# Patient Record
Sex: MALE | Race: WHITE | NOT HISPANIC OR LATINO | Employment: FULL TIME | URBAN - METROPOLITAN AREA
[De-identification: names, ages, dates, MRNs, and addresses within clinical notes are randomized per-mention and may not be internally consistent; named-entity substitution may affect disease eponyms.]

---

## 2017-04-13 ENCOUNTER — APPOINTMENT (OUTPATIENT)
Dept: GENERAL RADIOLOGY | Facility: HOSPITAL | Age: 47
End: 2017-04-13

## 2017-04-13 ENCOUNTER — HOSPITAL ENCOUNTER (EMERGENCY)
Facility: HOSPITAL | Age: 47
Discharge: HOME | End: 2017-04-13
Attending: EMERGENCY MEDICINE | Admitting: EMERGENCY MEDICINE

## 2017-04-13 ENCOUNTER — APPOINTMENT (OUTPATIENT)
Dept: CT IMAGING | Facility: HOSPITAL | Age: 47
End: 2017-04-13

## 2017-04-13 VITALS
TEMPERATURE: 98.8 F | SYSTOLIC BLOOD PRESSURE: 129 MMHG | HEART RATE: 58 BPM | OXYGEN SATURATION: 98 % | WEIGHT: 185 LBS | HEIGHT: 71 IN | RESPIRATION RATE: 16 BRPM | BODY MASS INDEX: 25.9 KG/M2 | DIASTOLIC BLOOD PRESSURE: 87 MMHG

## 2017-04-13 DIAGNOSIS — S00.83XA CONTUSION OF FACE, INITIAL ENCOUNTER: ICD-10-CM
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
## 2017-04-13 DIAGNOSIS — S10.81XA: ICD-10-CM

## 2017-04-13 DIAGNOSIS — L08.9: ICD-10-CM

## 2017-04-13 DIAGNOSIS — S20.20XA CONTUSION OF THORAX, INITIAL ENCOUNTER: ICD-10-CM

## 2017-04-13 PROCEDURE — 70450 CT HEAD/BRAIN W/O DYE: CPT

## 2017-04-13 PROCEDURE — 72072 X-RAY EXAM THORAC SPINE 3VWS: CPT

## 2017-04-13 PROCEDURE — 96372 THER/PROPH/DIAG INJ SC/IM: CPT

## 2017-04-13 PROCEDURE — 25010000002 KETOROLAC TROMETHAMINE PER 15 MG: Performed by: EMERGENCY MEDICINE

## 2017-04-13 PROCEDURE — 71010 HC CHEST PA OR AP: CPT

## 2017-04-13 PROCEDURE — 99284 EMERGENCY DEPT VISIT MOD MDM: CPT

## 2017-04-13 PROCEDURE — 72125 CT NECK SPINE W/O DYE: CPT

## 2017-04-13 PROCEDURE — 72170 X-RAY EXAM OF PELVIS: CPT

## 2017-04-13 RX ORDER — IBUPROFEN 800 MG/1
800 TABLET ORAL EVERY 8 HOURS PRN
Qty: 21 TABLET | Refills: 0 | Status: SHIPPED | OUTPATIENT
Start: 2017-04-13

## 2017-04-13 RX ORDER — CYCLOBENZAPRINE HCL 10 MG
10 TABLET ORAL ONCE
Status: COMPLETED | OUTPATIENT
Start: 2017-04-13 | End: 2017-04-13

## 2017-04-13 RX ORDER — OXYCODONE HYDROCHLORIDE AND ACETAMINOPHEN 5; 325 MG/1; MG/1
2 TABLET ORAL ONCE
Status: COMPLETED | OUTPATIENT
Start: 2017-04-13 | End: 2017-04-13

## 2017-04-13 RX ORDER — KETOROLAC TROMETHAMINE 30 MG/ML
60 INJECTION, SOLUTION INTRAMUSCULAR; INTRAVENOUS ONCE
Status: COMPLETED | OUTPATIENT
Start: 2017-04-13 | End: 2017-04-13

## 2017-04-13 RX ORDER — CYCLOBENZAPRINE HCL 10 MG
10 TABLET ORAL 3 TIMES DAILY PRN
Qty: 30 TABLET | Refills: 0 | Status: SHIPPED | OUTPATIENT
Start: 2017-04-13

## 2017-04-13 RX ORDER — HYDROCODONE BITARTRATE AND ACETAMINOPHEN 5; 325 MG/1; MG/1
1 TABLET ORAL EVERY 6 HOURS PRN
Qty: 10 TABLET | Refills: 0 | Status: SHIPPED | OUTPATIENT
Start: 2017-04-13

## 2017-04-13 RX ADMIN — KETOROLAC TROMETHAMINE 60 MG: 30 INJECTION, SOLUTION INTRAMUSCULAR at 16:58

## 2017-04-13 RX ADMIN — CYCLOBENZAPRINE HYDROCHLORIDE 10 MG: 10 TABLET, FILM COATED ORAL at 16:57

## 2017-04-13 RX ADMIN — OXYCODONE HYDROCHLORIDE AND ACETAMINOPHEN 2 TABLET: 5; 325 TABLET ORAL at 16:57

## 2017-04-13 NOTE — ED PROVIDER NOTES
" EMERGENCY DEPARTMENT ENCOUNTER    CHIEF COMPLAINT  Chief Complaint: fall  History given by: patient   History limited by: nothing   Room Number: 09/09  PMD: No Known Provider      HPI:  Pt is a 47 y.o. male who presents with R shoulder and neck pain secondary a fall that occurred 2 hours ago.  He reports that he was standing on a stepladder that was set against the back of his dump truck.  The dump truck bed was raised and he was scraping: Rash out of the bed.  He shifted his weight forward and the ladder fell causing him to land in the bed of the dump truck.  He states he landed on the left side of his head and that his body \"flipped over \".  He says everything went black but does not think that he lost consciousness as he remembers falling.  Once he landed in the bed of the dump truck, he then slid down the bed of the dump truck and fell again approximately 3 feet to the ground.  Pt states that his body was laying against his head after the fall.. Pt c/o of left axillary and neck pain that worsens when he breathes.     Duration:  today  Onset: gradual   Timing: constant  Location: head, neck , shoulder  Radiation: n/a  Quality: pain   Intensity/Severity: sever  Progression: unchanged  Associated Symptoms: neck and shoulder pain   Aggravating Factors: breathing  Alleviating Factors: none   Previous Episodes: none  Treatment before arrival: none     PAST MEDICAL HISTORY  Active Ambulatory Problems     Diagnosis Date Noted   • No Active Ambulatory Problems     Resolved Ambulatory Problems     Diagnosis Date Noted   • No Resolved Ambulatory Problems     Past Medical History:   Diagnosis Date   • Hypertension        PAST SURGICAL HISTORY  History reviewed. No pertinent surgical history.    FAMILY HISTORY  History reviewed. No pertinent family history.    SOCIAL HISTORY  Social History     Social History   • Marital status:      Spouse name: N/A   • Number of children: N/A   • Years of education: N/A "     Occupational History   • Not on file.     Social History Main Topics   • Smoking status: Not on file   • Smokeless tobacco: Not on file   • Alcohol use Not on file   • Drug use: Not on file   • Sexual activity: Not on file     Other Topics Concern   • Not on file     Social History Narrative   • No narrative on file       ALLERGIES  Morphine and related and Paxil [paroxetine hcl]    REVIEW OF SYSTEMS  Review of Systems   Constitutional: Negative for activity change, appetite change and fever.   HENT: Negative for congestion and sore throat.    Eyes: Negative.    Respiratory: Negative for cough and shortness of breath.    Cardiovascular: Negative for chest pain and leg swelling.   Gastrointestinal: Negative for abdominal pain, diarrhea and vomiting.   Endocrine: Negative.    Genitourinary: Negative for decreased urine volume and dysuria.   Musculoskeletal: Positive for back pain, neck pain and neck stiffness.   Skin: Negative for rash and wound.   Allergic/Immunologic: Negative.    Neurological: Negative for weakness, numbness and headaches.   Hematological: Negative.    Psychiatric/Behavioral: Negative.    All other systems reviewed and are negative.      PHYSICAL EXAM  ED Triage Vitals   Temp Heart Rate Resp BP SpO2   04/13/17 1554 04/13/17 1554 04/13/17 1554 04/13/17 1554 04/13/17 1554   98.8 °F (37.1 °C) 75 20 133/90 96 %      Temp src Heart Rate Source Patient Position BP Location FiO2 (%)   -- -- -- -- --              Physical Exam   Constitutional: He is oriented to person, place, and time and well-developed, well-nourished, and in no distress.   HENT:   Head: Normocephalic. Head is with abrasion (Left eyebrow and L side of head. ).   No malocculsion      Eyes: EOM are normal. Pupils are equal, round, and reactive to light.   Neck: Neck supple.   C-collar in place   Cardiovascular: Normal rate, regular rhythm and normal heart sounds.    Pulmonary/Chest: Effort normal and breath sounds normal. No  respiratory distress.   There is no paradoxical motion nor subcutaneous emphysema.  He is somewhat tender over his left superior lateral ribs.   Abdominal: Soft. There is no tenderness. There is no rebound and no guarding.   Musculoskeletal: Normal range of motion. He exhibits no edema.   No mid line or para spinal tenderness  Mild L and R paracervical to palpation  No lumbar tenderness      Neurovascular intact x4   Neurological: He is alert and oriented to person, place, and time. He has normal sensation and normal strength.   Skin: Skin is warm and dry.   Psychiatric: Mood and affect normal.   Nursing note and vitals reviewed.      LAB RESULTS  Lab Results (last 24 hours)     ** No results found for the last 24 hours. **          I ordered the above labs and reviewed the results    RADIOLOGY  XR Spine Thoracic 3 View   Preliminary Result   No active disease is seen in the chest.       THORACIC SPINE PLAIN FILMS: 3 views of the thoracic spine including AP   and lateral views of the entire thoracic spine and coned-down, swimmers,   and lateral view of the lower cervical and upper thoracic spine are   submitted for interpretation. On the lateral view the thoracic vertebral   body heights are well-maintained. There is good alignment of the   thoracic vertebrae. On the AP view the pedicles and spinous processes   are intact. The posterior medial ribs are intact. Medial lungs are   clear.       IMPRESSION: No acute fracture is seen in thoracic spine.          XR Chest 1 View   Preliminary Result   No active disease is seen in the chest.       THORACIC SPINE PLAIN FILMS: 3 views of the thoracic spine including AP   and lateral views of the entire thoracic spine and coned-down, swimmers,   and lateral view of the lower cervical and upper thoracic spine are   submitted for interpretation. On the lateral view the thoracic vertebral   body heights are well-maintained. There is good alignment of the   thoracic vertebrae. On  the AP view the pedicles and spinous processes   are intact. The posterior medial ribs are intact. Medial lungs are   clear.       IMPRESSION: No acute fracture is seen in thoracic spine.          XR Pelvis 1 or 2 View   Preliminary Result   No active disease is seen in the chest.       THORACIC SPINE PLAIN FILMS: 3 views of the thoracic spine including AP   and lateral views of the entire thoracic spine and coned-down, swimmers,   and lateral view of the lower cervical and upper thoracic spine are   submitted for interpretation. On the lateral view the thoracic vertebral   body heights are well-maintained. There is good alignment of the   thoracic vertebrae. On the AP view the pedicles and spinous processes   are intact. The posterior medial ribs are intact. Medial lungs are   clear.       IMPRESSION: No acute fracture is seen in thoracic spine.          CT Head Without Contrast   Preliminary Result       No evidence for acute traumatic intracranial pathology.       CT scan of the cervical spine was obtained with 2 mm axial images.   Sagittal reconstructed images were obtained.       FINDINGS:       There is no evidence for acute fracture or bony malalignment within the   cervical spine.       There is loss of the usual lordotic curvature of the cervical spine.       There is a disc osteophyte complex at C5-6 which results in a mild   degree of canal stenosis. Uncovertebral joint hypertrophy results in   mild to moderate bilateral foraminal narrowing at the C5-6 level.       IMPRESSION:        No evidence for acute fracture or bony malalignment involving the   cervical spine.       Degenerative phenomena primarily at the C5-6 levels, discussed in detail   above.                  CT Cervical Spine Without Contrast   Preliminary Result       No evidence for acute traumatic intracranial pathology.       CT scan of the cervical spine was obtained with 2 mm axial images.   Sagittal reconstructed images were obtained.        FINDINGS:       There is no evidence for acute fracture or bony malalignment within the   cervical spine.       There is loss of the usual lordotic curvature of the cervical spine.       There is a disc osteophyte complex at C5-6 which results in a mild   degree of canal stenosis. Uncovertebral joint hypertrophy results in   mild to moderate bilateral foraminal narrowing at the C5-6 level.       IMPRESSION:        No evidence for acute fracture or bony malalignment involving the   cervical spine.       Degenerative phenomena primarily at the C5-6 levels, discussed in detail   above.                       I ordered the above noted radiological studies. Interpreted by radiologist. Reviewed by me in PACS.       PROCEDURES  Procedures      PROGRESS AND CONSULTS  ED Course     4:28PM Ordered chest and spine XR, spine and head CT for further evaluation. Ordered Toradol and Percocet for pain  7:00PM Rechecked pt. Pt is resting comfortably but still c/o of stiffness. I informed him of his XR findings which show nothing acute with plans for discharge. Pt understands and agrees. All questions are addressed.         MEDICAL DECISION MAKING  Results were reviewed/discussed with the patient and they were also made aware of online access. Pt also made aware that some labs, such as cultures, will not be resulted during ER visit and follow up with PMD is necessary.     MDM  Number of Diagnoses or Management Options  Contusion of face, initial encounter:   Contusion of thorax, initial encounter:   Face abrasion, infected, initial encounter:   Injury resulting from fall from height:      Amount and/or Complexity of Data Reviewed  Tests in the radiology section of CPT®: reviewed and ordered           DIAGNOSIS  Final diagnoses:   Injury resulting from fall from height   Contusion of face, initial encounter   Face abrasion, infected, initial encounter   Contusion of thorax, initial encounter       DISPOSITION  DISCHARGE    Patient  discharged in stable condition.    Reviewed implications of results, diagnosis, meds, responsibility to follow up, warning signs and symptoms of possible worsening, potential complications and reasons to return to ER.    Patient/Family voiced understanding of above instructions.    Discussed plan for discharge, as there is no emergent indication for admission.  Pt/family is agreeable and understands need for follow up and repeat testing.  Pt is aware that discharge does not mean that nothing is wrong but it indicates no emergency is present that requires admission and they must continue care with follow-up as given below or physician of their choice.     FOLLOW-UP  [facility] ER REFERRAL SERVICE  [facility] [id number]  [phone]             Medication List      New Prescriptions          cyclobenzaprine 10 MG tablet   Commonly known as:  FLEXERIL   Take 1 tablet by mouth 3 (Three) Times a Day As Needed for Muscle Spasms.       HYDROcodone-acetaminophen 5-325 MG per tablet   Commonly known as:  NORCO   Take 1 tablet by mouth Every 6 (Six) Hours As Needed for Moderate Pain   (4-6).       ibuprofen 800 MG tablet   Commonly known as:  ADVIL,MOTRIN   Take 1 tablet by mouth Every 8 (Eight) Hours As Needed for Mild Pain   (1-3).               Latest Documented Vital Signs:  As of 8:30 PM  BP- 129/87 HR- 58 Temp- 98.8 °F (37.1 °C) O2 sat- 98%    --  Documentation assistance provided by reina Stiles for Dr. Jeronimo.  Information recorded by the scribe was done at my direction and has been verified and validated by me.     Jazmin Stiles  04/13/17 1629       Jazmin Stiles  04/13/17 1904       Jazmin Stiles  04/13/17 6010       Binh Jeronimo MD  04/13/17 2825

## 2017-04-13 NOTE — ED NOTES
Pt had a 20ft ladder raised against a dump truck that had its bed raised. Pt fell from the ladder and landed inside the dump truck bed, then fell out of the dump truck onto the ground.  Pt reports head pain, SOA, right clavicle pain, neck pain, upper back pain.      Marysol Stiles RN  04/13/17 5214

## 2017-04-13 NOTE — ED NOTES
Abrasion on face cleaned with chlorhexidine irrigated with 250 mL of normal saline. Dressed with  Bacitracin and gauzed, secured with tape.      Sade Pagan  04/13/17 7582